# Patient Record
Sex: FEMALE | Race: WHITE
[De-identification: names, ages, dates, MRNs, and addresses within clinical notes are randomized per-mention and may not be internally consistent; named-entity substitution may affect disease eponyms.]

---

## 2017-04-04 ENCOUNTER — HOSPITAL ENCOUNTER (OUTPATIENT)
Dept: HOSPITAL 79 - OPSV | Age: 82
End: 2017-04-04
Attending: INTERNAL MEDICINE
Payer: MEDICARE

## 2017-04-04 DIAGNOSIS — Z45.2: Primary | ICD-10-CM

## 2017-04-04 DIAGNOSIS — A49.02: ICD-10-CM

## 2017-08-21 ENCOUNTER — HOSPITAL ENCOUNTER (INPATIENT)
Dept: HOSPITAL 79 - ER1 | Age: 82
LOS: 15 days | Discharge: NURSING, REHAB OR LONG-TERM CARE | DRG: 638 | End: 2017-09-05
Attending: INTERNAL MEDICINE | Admitting: EMERGENCY MEDICINE
Payer: MEDICARE

## 2017-08-21 VITALS — HEIGHT: 63 IN | BODY MASS INDEX: 21.44 KG/M2 | WEIGHT: 121 LBS

## 2017-08-21 DIAGNOSIS — Z79.4: ICD-10-CM

## 2017-08-21 DIAGNOSIS — Z87.440: ICD-10-CM

## 2017-08-21 DIAGNOSIS — E11.69: Primary | ICD-10-CM

## 2017-08-21 DIAGNOSIS — M19.90: ICD-10-CM

## 2017-08-21 DIAGNOSIS — N30.01: ICD-10-CM

## 2017-08-21 DIAGNOSIS — E87.6: ICD-10-CM

## 2017-08-21 DIAGNOSIS — Z98.890: ICD-10-CM

## 2017-08-21 DIAGNOSIS — R00.1: ICD-10-CM

## 2017-08-21 DIAGNOSIS — B96.4: ICD-10-CM

## 2017-08-21 DIAGNOSIS — K56.41: ICD-10-CM

## 2017-08-21 DIAGNOSIS — I25.10: ICD-10-CM

## 2017-08-21 DIAGNOSIS — M86.661: ICD-10-CM

## 2017-08-21 DIAGNOSIS — D50.9: ICD-10-CM

## 2017-08-21 DIAGNOSIS — R78.81: ICD-10-CM

## 2017-08-21 DIAGNOSIS — I27.2: ICD-10-CM

## 2017-08-21 DIAGNOSIS — Z82.49: ICD-10-CM

## 2017-08-21 DIAGNOSIS — E03.9: ICD-10-CM

## 2017-08-21 DIAGNOSIS — Z95.1: ICD-10-CM

## 2017-08-21 DIAGNOSIS — I08.1: ICD-10-CM

## 2017-08-21 DIAGNOSIS — E11.649: ICD-10-CM

## 2017-08-21 DIAGNOSIS — Z79.82: ICD-10-CM

## 2017-08-21 DIAGNOSIS — N93.9: ICD-10-CM

## 2017-08-21 DIAGNOSIS — Z74.01: ICD-10-CM

## 2017-08-21 DIAGNOSIS — L89.620: ICD-10-CM

## 2017-08-21 DIAGNOSIS — Z86.14: ICD-10-CM

## 2017-08-21 DIAGNOSIS — E78.5: ICD-10-CM

## 2017-08-21 DIAGNOSIS — Z72.3: ICD-10-CM

## 2017-08-21 DIAGNOSIS — E11.65: ICD-10-CM

## 2017-08-21 DIAGNOSIS — Z79.02: ICD-10-CM

## 2017-08-21 DIAGNOSIS — E83.42: ICD-10-CM

## 2017-08-21 DIAGNOSIS — L89.152: ICD-10-CM

## 2017-08-21 DIAGNOSIS — Z79.899: ICD-10-CM

## 2017-08-21 LAB
BUN/CREATININE RATIO: 29 (ref 0–10)
HGB BLD-MCNC: 12.4 GM/DL (ref 12.3–15.3)
RED BLOOD COUNT: 4.57 M/UL (ref 4–5.1)
WHITE BLOOD COUNT: 9.1 K/UL (ref 4.5–11)

## 2017-08-21 PROCEDURE — G0378 HOSPITAL OBSERVATION PER HR: HCPCS

## 2017-08-23 LAB
BUN/CREATININE RATIO: 23 (ref 0–10)
MECA (METHICILLIN RESIST GENE: DETECTED
STAPHYLOCOCCUS: DETECTED

## 2017-08-24 LAB
BUN/CREATININE RATIO: 26 (ref 0–10)
HGB BLD-MCNC: 10.1 GM/DL (ref 12.3–15.3)
RED BLOOD COUNT: 3.79 M/UL (ref 4–5.1)
WHITE BLOOD COUNT: 6.2 K/UL (ref 4.5–11)

## 2017-08-26 LAB
BUN/CREATININE RATIO: 24 (ref 0–10)
HGB BLD-MCNC: 10.4 GM/DL (ref 12.3–15.3)
RED BLOOD COUNT: 3.85 M/UL (ref 4–5.1)
WHITE BLOOD COUNT: 6.5 K/UL (ref 4.5–11)

## 2017-08-27 LAB
BUN/CREATININE RATIO: 31 (ref 0–10)
HGB BLD-MCNC: 10.8 GM/DL (ref 12.3–15.3)
RED BLOOD COUNT: 4.01 M/UL (ref 4–5.1)
WHITE BLOOD COUNT: 7.5 K/UL (ref 4.5–11)

## 2017-08-28 LAB
BUN/CREATININE RATIO: 27 (ref 0–10)
HGB BLD-MCNC: 10.4 GM/DL (ref 12.3–15.3)
RED BLOOD COUNT: 3.86 M/UL (ref 4–5.1)
WHITE BLOOD COUNT: 6.3 K/UL (ref 4.5–11)

## 2017-08-29 LAB
BUN/CREATININE RATIO: 24 (ref 0–10)
HGB BLD-MCNC: 10.7 GM/DL (ref 12.3–15.3)
RED BLOOD COUNT: 3.94 M/UL (ref 4–5.1)
WHITE BLOOD COUNT: 6 K/UL (ref 4.5–11)

## 2017-08-30 PROCEDURE — B548ZZA ULTRASONOGRAPHY OF SUPERIOR VENA CAVA, GUIDANCE: ICD-10-PCS | Performed by: INTERNAL MEDICINE

## 2017-08-30 PROCEDURE — 02HV33Z INSERTION OF INFUSION DEVICE INTO SUPERIOR VENA CAVA, PERCUTANEOUS APPROACH: ICD-10-PCS | Performed by: INTERNAL MEDICINE

## 2017-09-01 LAB — BUN/CREATININE RATIO: 32 (ref 0–10)

## 2017-09-02 LAB
BUN/CREATININE RATIO: 30 (ref 0–10)
HGB BLD-MCNC: 9.7 GM/DL (ref 12.3–15.3)
RED BLOOD COUNT: 3.57 M/UL (ref 4–5.1)
WHITE BLOOD COUNT: 7.8 K/UL (ref 4.5–11)

## 2017-09-03 LAB
BUN/CREATININE RATIO: 28 (ref 0–10)
HGB BLD-MCNC: 9.4 GM/DL (ref 12.3–15.3)
RED BLOOD COUNT: 3.44 M/UL (ref 4–5.1)
WHITE BLOOD COUNT: 7.6 K/UL (ref 4.5–11)

## 2017-09-04 LAB
BUN/CREATININE RATIO: 26 (ref 0–10)
HGB BLD-MCNC: 9.4 GM/DL (ref 12.3–15.3)

## 2017-09-17 ENCOUNTER — HOSPITAL ENCOUNTER (OUTPATIENT)
Dept: HOSPITAL 79 - LBRF | Age: 82
End: 2017-09-17
Attending: CLINIC/CENTER
Payer: MEDICARE

## 2017-09-17 DIAGNOSIS — E09.65: Primary | ICD-10-CM

## 2021-06-18 NOTE — NUR
PATIENT'S DAUGHTER IS CONCERNED DUE TO COMPLAINTS OF INTERMITTENT PAIN IN
RIGHT HAND/WRIST. SHE WISHES TO HAVE THIS AREA X-RAYED PRIOR TO DISCHARGE DUE
TO RECENT FALL AT HOME. DISCUSSED WITH DR. GARCIA, NEW ORDERS NOTED.

## 2022-06-27 NOTE — NUR
PATIENT HAD CRITICAL LAB VALUE, BLOOD GLUCOSE OF 43 ON MORNING LABS. D50 GIVEN
PROVIDER NOTIFED. NO NEW ORDERS AT THIS TIME. WILL CONTINUE TO MONITOR.

## 2022-06-29 NOTE — NUR
11:00- NOTIFIED DR RICARDO OF [facility] THIS MORNING. PT WAS GIVEN DEXTROSE.
LAST BG CHECK . PT BLOOD PRESSURE 82/43. NO NEW ORDERS AT THIS TIME.

## 2022-07-03 NOTE — NUR
Patient family notified that patient will not be discharged today because of
changes in antibiotic and availability for pickup over the holiday.

## 2022-09-21 NOTE — NUR
PT TEMP 93 RECTALLY. MARJORIE HUGGAIR PLACED ON PT, TEMP INCREASED IN ROOM, AND
MD AWARE. WILL CONTINUE TO MONITOR

## 2022-09-22 NOTE — NUR
NOTIFIED LAVON OF [facility] DEATH. PT RULED OUT FOR DONATION AND OK TO RELEASE TO
 HOME PER NINA ALEXIS. CASE # [id number]

## 2022-09-22 NOTE — NUR
NOTIFIED ALEAH NEFF, PT GRANDDAUGHTER OF DEATH. SHE STATES SHE WANTS THE PT
TO GO TO [facility]